# Patient Record
Sex: MALE | Race: WHITE | URBAN - METROPOLITAN AREA
[De-identification: names, ages, dates, MRNs, and addresses within clinical notes are randomized per-mention and may not be internally consistent; named-entity substitution may affect disease eponyms.]

---

## 2017-02-01 RX ORDER — ATORVASTATIN CALCIUM 40 MG/1
40 TABLET, FILM COATED ORAL DAILY
Qty: 90 TABLET | Refills: 3 | Status: SHIPPED | OUTPATIENT
Start: 2017-02-01 | End: 2017-12-20 | Stop reason: DRUGHIGH

## 2017-03-21 ENCOUNTER — TELEPHONE (OUTPATIENT)
Dept: CARDIOLOGY CLINIC | Age: 82
End: 2017-03-21

## 2017-03-21 RX ORDER — RAMIPRIL 5 MG/1
CAPSULE ORAL
Qty: 90 CAPSULE | Refills: 2 | Status: SHIPPED | OUTPATIENT
Start: 2017-03-21 | End: 2017-11-30 | Stop reason: SDUPTHER

## 2017-05-05 RX ORDER — CLOPIDOGREL BISULFATE 75 MG/1
TABLET ORAL
Qty: 30 TABLET | Refills: 10 | Status: SHIPPED | OUTPATIENT
Start: 2017-05-05 | End: 2018-04-02 | Stop reason: SDUPTHER

## 2017-05-17 ENCOUNTER — OFFICE VISIT (OUTPATIENT)
Dept: CARDIOLOGY CLINIC | Age: 82
End: 2017-05-17

## 2017-05-17 VITALS
BODY MASS INDEX: 25.69 KG/M2 | HEART RATE: 60 BPM | DIASTOLIC BLOOD PRESSURE: 70 MMHG | WEIGHT: 164 LBS | SYSTOLIC BLOOD PRESSURE: 120 MMHG

## 2017-05-17 DIAGNOSIS — I65.22 STENOSIS OF LEFT CAROTID ARTERY: ICD-10-CM

## 2017-05-17 DIAGNOSIS — Z98.61 POSTSURGICAL PERCUTANEOUS TRANSLUMINAL CORONARY ANGIOPLASTY STATUS: ICD-10-CM

## 2017-05-17 DIAGNOSIS — I10 ESSENTIAL HYPERTENSION: Chronic | ICD-10-CM

## 2017-05-17 DIAGNOSIS — I73.9 PVD (PERIPHERAL VASCULAR DISEASE) (HCC): ICD-10-CM

## 2017-05-17 DIAGNOSIS — I21.09 ACUTE MI ANTERIOR WALL FIRST EPISODE CARE (HCC): Primary | ICD-10-CM

## 2017-05-17 PROCEDURE — G8598 ASA/ANTIPLAT THER USED: HCPCS | Performed by: INTERNAL MEDICINE

## 2017-05-17 PROCEDURE — 1123F ACP DISCUSS/DSCN MKR DOCD: CPT | Performed by: INTERNAL MEDICINE

## 2017-05-17 PROCEDURE — 99214 OFFICE O/P EST MOD 30 MIN: CPT | Performed by: INTERNAL MEDICINE

## 2017-05-17 PROCEDURE — G8420 CALC BMI NORM PARAMETERS: HCPCS | Performed by: INTERNAL MEDICINE

## 2017-05-17 PROCEDURE — G8427 DOCREV CUR MEDS BY ELIG CLIN: HCPCS | Performed by: INTERNAL MEDICINE

## 2017-05-17 PROCEDURE — 4040F PNEUMOC VAC/ADMIN/RCVD: CPT | Performed by: INTERNAL MEDICINE

## 2017-05-17 PROCEDURE — 1036F TOBACCO NON-USER: CPT | Performed by: INTERNAL MEDICINE

## 2017-05-22 ENCOUNTER — HOSPITAL ENCOUNTER (OUTPATIENT)
Dept: VASCULAR LAB | Age: 82
Discharge: HOME | End: 2017-05-22
Attending: INTERNAL MEDICINE | Admitting: INTERNAL MEDICINE

## 2017-05-22 DIAGNOSIS — I65.22 STENOSIS OF LEFT CAROTID ARTERY: Primary | ICD-10-CM

## 2017-05-22 DIAGNOSIS — Z98.61 CORONARY ANGIOPLASTY STATUS: ICD-10-CM

## 2017-05-30 ENCOUNTER — TELEPHONE (OUTPATIENT)
Dept: CARDIOLOGY CLINIC | Age: 82
End: 2017-05-30

## 2017-11-06 RX ORDER — METOPROLOL SUCCINATE 25 MG/1
TABLET, EXTENDED RELEASE ORAL
Qty: 30 TABLET | Refills: 3 | Status: SHIPPED | OUTPATIENT
Start: 2017-11-06 | End: 2018-04-09 | Stop reason: SDUPTHER

## 2017-11-30 RX ORDER — RAMIPRIL 5 MG/1
CAPSULE ORAL
Qty: 90 CAPSULE | Refills: 3 | Status: SHIPPED | OUTPATIENT
Start: 2017-11-30 | End: 2018-11-19 | Stop reason: SDUPTHER

## 2017-12-20 ENCOUNTER — OFFICE VISIT (OUTPATIENT)
Dept: CARDIOLOGY CLINIC | Age: 82
End: 2017-12-20

## 2017-12-20 VITALS
SYSTOLIC BLOOD PRESSURE: 124 MMHG | DIASTOLIC BLOOD PRESSURE: 78 MMHG | BODY MASS INDEX: 25.69 KG/M2 | HEART RATE: 64 BPM | WEIGHT: 164 LBS

## 2017-12-20 DIAGNOSIS — I10 ESSENTIAL HYPERTENSION: Primary | ICD-10-CM

## 2017-12-20 DIAGNOSIS — I21.09 ACUTE MI ANTERIOR WALL FIRST EPISODE CARE (HCC): ICD-10-CM

## 2017-12-20 PROCEDURE — G8484 FLU IMMUNIZE NO ADMIN: HCPCS | Performed by: INTERNAL MEDICINE

## 2017-12-20 PROCEDURE — G8598 ASA/ANTIPLAT THER USED: HCPCS | Performed by: INTERNAL MEDICINE

## 2017-12-20 PROCEDURE — G8419 CALC BMI OUT NRM PARAM NOF/U: HCPCS | Performed by: INTERNAL MEDICINE

## 2017-12-20 PROCEDURE — G8427 DOCREV CUR MEDS BY ELIG CLIN: HCPCS | Performed by: INTERNAL MEDICINE

## 2017-12-20 PROCEDURE — 1123F ACP DISCUSS/DSCN MKR DOCD: CPT | Performed by: INTERNAL MEDICINE

## 2017-12-20 PROCEDURE — 1036F TOBACCO NON-USER: CPT | Performed by: INTERNAL MEDICINE

## 2017-12-20 PROCEDURE — 99214 OFFICE O/P EST MOD 30 MIN: CPT | Performed by: INTERNAL MEDICINE

## 2017-12-20 PROCEDURE — 4040F PNEUMOC VAC/ADMIN/RCVD: CPT | Performed by: INTERNAL MEDICINE

## 2017-12-20 RX ORDER — ATORVASTATIN CALCIUM 40 MG/1
20 TABLET, FILM COATED ORAL DAILY
Qty: 90 TABLET | Refills: 3 | Status: SHIPPED | OUTPATIENT
Start: 2017-12-20 | End: 2019-08-27 | Stop reason: SINTOL

## 2017-12-20 NOTE — PROGRESS NOTES
Psychiatric/Behavioral: Negative. Objective:   Physical Exam   Nursing note and vitals reviewed. Constitutional: He is oriented to person, place, and time. He appears well-developed and well-nourished. No distress. HENT:   Head: Normocephalic and atraumatic. Eyes: Conjunctivae and EOM are normal. Pupils are equal, round, and reactive to light. No scleral icterus. Neck: Normal range of motion. Neck supple. No JVD present. No tracheal deviation present. No thyromegaly present. Cardiovascular: Normal rate, regular rhythm, normal heart sounds and intact distal pulses. Exam reveals no gallop and no friction rub. No murmur heard. Pulmonary/Chest: Effort normal. No respiratory distress. He has no wheezes. He has no rales. He exhibits no tenderness. Abdominal: Soft. Bowel sounds are normal. He exhibits no distension and no mass. There is no tenderness. There is no rebound and no guarding. Musculoskeletal: He exhibits no edema and no tenderness. Lymphadenopathy:     He has no cervical adenopathy. Neurological: He is alert and oriented to person, place, and time. No cranial nerve deficit. Coordination normal.   Skin: Skin is warm and dry. No rash noted. He is not diaphoretic. No erythema. No pallor. Psychiatric: He has a normal mood and affect. His behavior is normal. Judgment and thought content normal.       Assessment:      Patient Active Problem List   Diagnosis    Chest pain, cardiac    HTN (hypertension)    Hyperlipidemia    Acute MI anterior wall [facility])    Postsurgical percutaneous transluminal coronary angioplasty status           Plan:      Continue current medications. Stable cardiovascular status. HTN is controlled. US of neck was reviewed and has left ICA 50-69%. Decrease lipitor 20 mg daily. Stable CV status.

## 2018-04-02 RX ORDER — CLOPIDOGREL BISULFATE 75 MG/1
TABLET ORAL
Qty: 30 TABLET | Refills: 9 | Status: SHIPPED | OUTPATIENT
Start: 2018-04-02 | End: 2019-01-28 | Stop reason: SDUPTHER

## 2018-04-09 RX ORDER — METOPROLOL SUCCINATE 25 MG/1
TABLET, EXTENDED RELEASE ORAL
Qty: 45 TABLET | Refills: 3 | Status: SHIPPED | OUTPATIENT
Start: 2018-04-09

## 2018-06-13 ENCOUNTER — OFFICE VISIT (OUTPATIENT)
Dept: CARDIOLOGY CLINIC | Age: 83
End: 2018-06-13

## 2018-06-13 VITALS
SYSTOLIC BLOOD PRESSURE: 138 MMHG | WEIGHT: 157 LBS | HEART RATE: 62 BPM | DIASTOLIC BLOOD PRESSURE: 72 MMHG | BODY MASS INDEX: 24.59 KG/M2

## 2018-06-13 DIAGNOSIS — I77.9 BILATERAL CAROTID ARTERY DISEASE (HCC): ICD-10-CM

## 2018-06-13 DIAGNOSIS — R09.89 BILATERAL CAROTID BRUITS: ICD-10-CM

## 2018-06-13 DIAGNOSIS — I10 ESSENTIAL HYPERTENSION: Primary | Chronic | ICD-10-CM

## 2018-06-13 DIAGNOSIS — I21.09 ACUTE MI ANTERIOR WALL FIRST EPISODE CARE (HCC): ICD-10-CM

## 2018-06-13 DIAGNOSIS — Z98.61 POSTSURGICAL PERCUTANEOUS TRANSLUMINAL CORONARY ANGIOPLASTY STATUS: ICD-10-CM

## 2018-06-13 DIAGNOSIS — I73.9 PVD (PERIPHERAL VASCULAR DISEASE) (HCC): ICD-10-CM

## 2018-06-13 PROCEDURE — 1036F TOBACCO NON-USER: CPT | Performed by: INTERNAL MEDICINE

## 2018-06-13 PROCEDURE — G8427 DOCREV CUR MEDS BY ELIG CLIN: HCPCS | Performed by: INTERNAL MEDICINE

## 2018-06-13 PROCEDURE — G8420 CALC BMI NORM PARAMETERS: HCPCS | Performed by: INTERNAL MEDICINE

## 2018-06-13 PROCEDURE — 1123F ACP DISCUSS/DSCN MKR DOCD: CPT | Performed by: INTERNAL MEDICINE

## 2018-06-13 PROCEDURE — G8598 ASA/ANTIPLAT THER USED: HCPCS | Performed by: INTERNAL MEDICINE

## 2018-06-13 PROCEDURE — 4040F PNEUMOC VAC/ADMIN/RCVD: CPT | Performed by: INTERNAL MEDICINE

## 2018-06-13 PROCEDURE — 99214 OFFICE O/P EST MOD 30 MIN: CPT | Performed by: INTERNAL MEDICINE

## 2018-06-21 ENCOUNTER — HOSPITAL ENCOUNTER (OUTPATIENT)
Dept: NON INVASIVE DIAGNOSTICS | Age: 83
Discharge: HOME | End: 2018-06-21
Admitting: INTERNAL MEDICINE

## 2018-06-21 DIAGNOSIS — R09.89 CAROTID BRUIT, UNSPECIFIED LATERALITY: Primary | ICD-10-CM

## 2018-06-21 DIAGNOSIS — I21.09 ST ELEVATION (STEMI) MYOCARDIAL INFARCTION INVOLVING OTHER CORONARY ARTERY OF ANTERIOR WALL (CODE): ICD-10-CM

## 2018-06-21 DIAGNOSIS — I65.23 BILATERAL CAROTID ARTERY STENOSIS: ICD-10-CM

## 2018-06-21 LAB
LV EF: 50 %
LVEF MODALITY: NORMAL

## 2018-06-28 ENCOUNTER — TELEPHONE (OUTPATIENT)
Dept: CARDIOLOGY CLINIC | Age: 83
End: 2018-06-28

## 2018-06-28 DIAGNOSIS — I65.22 STENOSIS OF LEFT CAROTID ARTERY: ICD-10-CM

## 2018-06-28 DIAGNOSIS — I73.9 PVD (PERIPHERAL VASCULAR DISEASE) (HCC): Primary | ICD-10-CM

## 2018-11-07 ENCOUNTER — OFFICE VISIT (OUTPATIENT)
Dept: CARDIOLOGY CLINIC | Age: 83
End: 2018-11-07
Payer: MEDICARE

## 2018-11-07 VITALS
HEART RATE: 74 BPM | DIASTOLIC BLOOD PRESSURE: 60 MMHG | SYSTOLIC BLOOD PRESSURE: 138 MMHG | BODY MASS INDEX: 24.78 KG/M2 | WEIGHT: 158.2 LBS

## 2018-11-07 DIAGNOSIS — Z98.61 POSTSURGICAL PERCUTANEOUS TRANSLUMINAL CORONARY ANGIOPLASTY STATUS: Primary | ICD-10-CM

## 2018-11-07 DIAGNOSIS — I73.9 PVD (PERIPHERAL VASCULAR DISEASE) (HCC): ICD-10-CM

## 2018-11-07 DIAGNOSIS — I21.09 ACUTE MI ANTERIOR WALL FIRST EPISODE CARE (HCC): ICD-10-CM

## 2018-11-07 DIAGNOSIS — E78.2 MIXED HYPERLIPIDEMIA: Chronic | ICD-10-CM

## 2018-11-07 PROCEDURE — 1036F TOBACCO NON-USER: CPT | Performed by: INTERNAL MEDICINE

## 2018-11-07 PROCEDURE — G8598 ASA/ANTIPLAT THER USED: HCPCS | Performed by: INTERNAL MEDICINE

## 2018-11-07 PROCEDURE — 99214 OFFICE O/P EST MOD 30 MIN: CPT | Performed by: INTERNAL MEDICINE

## 2018-11-07 PROCEDURE — 4040F PNEUMOC VAC/ADMIN/RCVD: CPT | Performed by: INTERNAL MEDICINE

## 2018-11-07 PROCEDURE — G8484 FLU IMMUNIZE NO ADMIN: HCPCS | Performed by: INTERNAL MEDICINE

## 2018-11-07 PROCEDURE — 1123F ACP DISCUSS/DSCN MKR DOCD: CPT | Performed by: INTERNAL MEDICINE

## 2018-11-07 PROCEDURE — G8427 DOCREV CUR MEDS BY ELIG CLIN: HCPCS | Performed by: INTERNAL MEDICINE

## 2018-11-07 PROCEDURE — 1101F PT FALLS ASSESS-DOCD LE1/YR: CPT | Performed by: INTERNAL MEDICINE

## 2018-11-07 PROCEDURE — G8420 CALC BMI NORM PARAMETERS: HCPCS | Performed by: INTERNAL MEDICINE

## 2018-11-19 RX ORDER — RAMIPRIL 5 MG/1
CAPSULE ORAL
Qty: 90 CAPSULE | Refills: 3 | Status: SHIPPED | OUTPATIENT
Start: 2018-11-19 | End: 2019-10-02

## 2019-01-28 RX ORDER — CLOPIDOGREL BISULFATE 75 MG/1
TABLET ORAL
Qty: 90 TABLET | Refills: 3 | Status: SHIPPED | OUTPATIENT
Start: 2019-01-28 | End: 2019-07-31

## 2019-06-10 ENCOUNTER — OFFICE VISIT (OUTPATIENT)
Dept: CARDIOLOGY CLINIC | Age: 84
End: 2019-06-10
Payer: MEDICARE

## 2019-06-10 VITALS
BODY MASS INDEX: 24.59 KG/M2 | DIASTOLIC BLOOD PRESSURE: 80 MMHG | WEIGHT: 157 LBS | SYSTOLIC BLOOD PRESSURE: 130 MMHG | HEART RATE: 53 BPM

## 2019-06-10 DIAGNOSIS — I73.9 PVD (PERIPHERAL VASCULAR DISEASE) (HCC): Primary | ICD-10-CM

## 2019-06-10 PROCEDURE — G8427 DOCREV CUR MEDS BY ELIG CLIN: HCPCS | Performed by: INTERNAL MEDICINE

## 2019-06-10 PROCEDURE — 1036F TOBACCO NON-USER: CPT | Performed by: INTERNAL MEDICINE

## 2019-06-10 PROCEDURE — 4040F PNEUMOC VAC/ADMIN/RCVD: CPT | Performed by: INTERNAL MEDICINE

## 2019-06-10 PROCEDURE — 99213 OFFICE O/P EST LOW 20 MIN: CPT | Performed by: INTERNAL MEDICINE

## 2019-06-10 PROCEDURE — G8598 ASA/ANTIPLAT THER USED: HCPCS | Performed by: INTERNAL MEDICINE

## 2019-06-10 PROCEDURE — 1123F ACP DISCUSS/DSCN MKR DOCD: CPT | Performed by: INTERNAL MEDICINE

## 2019-06-10 PROCEDURE — G8420 CALC BMI NORM PARAMETERS: HCPCS | Performed by: INTERNAL MEDICINE

## 2019-06-10 PROCEDURE — 93000 ELECTROCARDIOGRAM COMPLETE: CPT | Performed by: INTERNAL MEDICINE

## 2019-06-10 NOTE — PROGRESS NOTES
Hypertension, and MI (myocardial infarction) ([facility] [address] Current Outpatient Medications   Medication Sig Dispense Refill    clopidogrel (PLAVIX) 75 MG tablet TAKE ONE TABLET BY MOUTH DAILY 90 tablet 3    ramipril (ALTACE) 5 MG capsule TAKE ONE CAPSULE BY MOUTH DAILY 90 capsule 3    vitamin D (CHOLECALCIFEROL) 1000 UNIT TABS tablet Take 1,000 Units by mouth daily      folic acid-pyridoxine-cyancobalamin (FOLBIC) 2.5-25-2 MG TABS Take 1 tablet by mouth daily      metoprolol succinate (TOPROL XL) 25 MG extended release tablet TAKE ONE-HALF TABLET BY MOUTH DAILY 45 tablet 3    atorvastatin (LIPITOR) 40 MG tablet Take 0.5 tablets by mouth daily 90 tablet 3    solifenacin (VESICARE) 5 MG tablet Take 10 mg by mouth daily      aspirin 81 MG tablet Take 81 mg by mouth daily.  Dutasteride (AVODART PO) Take  by mouth daily. No current facility-administered medications for this visit. Vitals  Weight: 157 lb (71.2 kg)  Blood Pressure:  138/84  Pulse: 53       Review of Systems   Constitutional: Negative. HENT: Negative. Eyes: Negative. Respiratory: Negative. Cardiovascular: Negative. Gastrointestinal: Negative. Genitourinary: Negative. Musculoskeletal: Negative. Skin: Negative. Neurological: Negative. Hematological: Negative. Psychiatric/Behavioral: Negative. Objective:   Physical Exam   Nursing note and vitals reviewed. Constitutional: He is oriented to person, place, and time. He appears well-developed and well-nourished. No distress. HENT:   Head: Normocephalic and atraumatic. Eyes: Conjunctivae and EOM are normal. Pupils are equal, round, and reactive to light. No scleral icterus. Neck: Normal range of motion. Neck supple. No JVD present. No tracheal deviation present. No thyromegaly present. Cardiovascular: Normal rate, regular rhythm, normal heart sounds and intact distal pulses. Exam reveals no gallop and no friction rub.     No murmur heard.  Pulmonary/Chest: Effort normal. No respiratory distress. He has no wheezes. He has no rales. He exhibits no tenderness. Abdominal: Soft. Bowel sounds are normal. He exhibits no distension and no mass. There is no tenderness. There is no rebound and no guarding. Musculoskeletal: He exhibits no edema and no tenderness. Lymphadenopathy:     He has no cervical adenopathy. Neurological: He is alert and oriented to person, place, and time. No cranial nerve deficit. Coordination normal.   Skin: Skin is warm and dry. No rash noted. He is not diaphoretic. No erythema. No pallor. Psychiatric: He has a normal mood and affect. His behavior is normal. Judgment and thought content normal.       Assessment:      Patient Active Problem List   Diagnosis    Chest pain, cardiac    HTN (hypertension)    Hyperlipidemia    Acute MI anterior wall first episode care [facility])    Postsurgical percutaneous transluminal coronary angioplasty status           Plan:      Continue current medications. Stable cardiovascular status. HTN is controlled. LBBB and SR.  US of neck was reviewed and has left ICA 50-69%. Needs yearly assessment. Hypercholesterolemia continuing lipitor 40 mg daily. ldl 69 on 4/5/18  PVD:  Seeing and following up with Dr. Keke Gutierrez for carotid stenoses that are asymptomatic  CAD:  Stable and no chest pain nor syncope nor palptiations.

## 2019-07-31 ENCOUNTER — OFFICE VISIT (OUTPATIENT)
Dept: CARDIOLOGY CLINIC | Age: 84
End: 2019-07-31
Payer: MEDICARE

## 2019-07-31 VITALS
BODY MASS INDEX: 25.03 KG/M2 | DIASTOLIC BLOOD PRESSURE: 80 MMHG | WEIGHT: 159.8 LBS | SYSTOLIC BLOOD PRESSURE: 130 MMHG | HEART RATE: 71 BPM

## 2019-07-31 DIAGNOSIS — I48.91 ATRIAL FIBRILLATION, UNSPECIFIED TYPE (HCC): ICD-10-CM

## 2019-07-31 DIAGNOSIS — I73.9 PVD (PERIPHERAL VASCULAR DISEASE) (HCC): ICD-10-CM

## 2019-07-31 DIAGNOSIS — Z98.61 POSTSURGICAL PERCUTANEOUS TRANSLUMINAL CORONARY ANGIOPLASTY STATUS: Primary | ICD-10-CM

## 2019-07-31 PROCEDURE — 93000 ELECTROCARDIOGRAM COMPLETE: CPT | Performed by: INTERNAL MEDICINE

## 2019-07-31 PROCEDURE — 4040F PNEUMOC VAC/ADMIN/RCVD: CPT | Performed by: INTERNAL MEDICINE

## 2019-07-31 PROCEDURE — G8419 CALC BMI OUT NRM PARAM NOF/U: HCPCS | Performed by: INTERNAL MEDICINE

## 2019-07-31 PROCEDURE — G8598 ASA/ANTIPLAT THER USED: HCPCS | Performed by: INTERNAL MEDICINE

## 2019-07-31 PROCEDURE — 1123F ACP DISCUSS/DSCN MKR DOCD: CPT | Performed by: INTERNAL MEDICINE

## 2019-07-31 PROCEDURE — 1036F TOBACCO NON-USER: CPT | Performed by: INTERNAL MEDICINE

## 2019-07-31 PROCEDURE — 99214 OFFICE O/P EST MOD 30 MIN: CPT | Performed by: INTERNAL MEDICINE

## 2019-07-31 PROCEDURE — G8428 CUR MEDS NOT DOCUMENT: HCPCS | Performed by: INTERNAL MEDICINE

## 2019-07-31 RX ORDER — AMIODARONE HYDROCHLORIDE 200 MG/1
200 TABLET ORAL
COMMUNITY
Start: 2019-07-23 | End: 2019-08-20 | Stop reason: SDUPTHER

## 2019-07-31 NOTE — PROGRESS NOTES
Subjective:      Patient ID: Malik Odom is a 80 y.o. male. CC:  Follow up of CAD with stenting during MI in 2012. HPI Mr. Irlanda Hurley feels well and has no chest pain but did with Atrial fib and was started amiodarone 200mg daily after load at [address] and started on eliquis for 2.5 mg bid. No chest pain now and no HF. SR with LBBB on ecg today. No Known Allergies     Social History     Socioeconomic History    Marital status:      Spouse name: Not on file    Number of children: Not on file    Years of education: Not on file    Highest education level: Not on file   Occupational History    Not on file   Social Needs    Financial resource strain: Not on file    Food insecurity:     Worry: Not on file     Inability: Not on file    Transportation needs:     Medical: Not on file     Non-medical: Not on file   Tobacco Use    Smoking status: Former Smoker     Last attempt to quit: 2009     Years since quittin.6    Smokeless tobacco: Never Used   Substance and Sexual Activity    Alcohol use: No    Drug use: No    Sexual activity: Not on file   Lifestyle    Physical activity:     Days per week: Not on file     Minutes per session: Not on file    Stress: Not on file   Relationships    Social connections:     Talks on phone: Not on file     Gets together: Not on file     Attends Confucianist service: Not on file     Active member of club or organization: Not on file     Attends meetings of clubs or organizations: Not on file     Relationship status: Not on file    Intimate partner violence:     Fear of current or ex partner: Not on file     Emotionally abused: Not on file     Physically abused: Not on file     Forced sexual activity: Not on file   Other Topics Concern    Not on file   Social History Narrative    Not on file        Patient has a family history is not on file.     Patient  has a past medical history of CAD (coronary artery disease), Hyperlipidemia, Hypertension,

## 2019-08-20 ENCOUNTER — TELEPHONE (OUTPATIENT)
Dept: CARDIOLOGY CLINIC | Age: 84
End: 2019-08-20

## 2019-08-20 RX ORDER — AMIODARONE HYDROCHLORIDE 200 MG/1
200 TABLET ORAL DAILY
Qty: 90 TABLET | Refills: 3 | Status: SHIPPED | OUTPATIENT
Start: 2019-08-20 | End: 2019-10-02 | Stop reason: SDUPTHER

## 2019-08-27 DIAGNOSIS — E78.2 MIXED HYPERLIPIDEMIA: Primary | Chronic | ICD-10-CM

## 2019-08-27 RX ORDER — ROSUVASTATIN CALCIUM 5 MG/1
5 TABLET, COATED ORAL DAILY
Qty: 90 TABLET | Refills: 1 | Status: SHIPPED | OUTPATIENT
Start: 2019-08-27 | End: 2020-02-17

## 2019-09-06 ENCOUNTER — TELEPHONE (OUTPATIENT)
Dept: CARDIOLOGY CLINIC | Age: 84
End: 2019-09-06

## 2019-09-30 DIAGNOSIS — I48.91 ATRIAL FIBRILLATION, UNSPECIFIED TYPE (HCC): ICD-10-CM

## 2019-09-30 DIAGNOSIS — I73.9 PVD (PERIPHERAL VASCULAR DISEASE) (HCC): ICD-10-CM

## 2019-09-30 DIAGNOSIS — E78.2 MIXED HYPERLIPIDEMIA: Chronic | ICD-10-CM

## 2019-09-30 DIAGNOSIS — Z98.61 POSTSURGICAL PERCUTANEOUS TRANSLUMINAL CORONARY ANGIOPLASTY STATUS: ICD-10-CM

## 2019-09-30 LAB
A/G RATIO: 1.3 (ref 1.1–2.2)
ALBUMIN SERPL-MCNC: 4 G/DL (ref 3.4–5)
ALP BLD-CCNC: 72 U/L (ref 40–129)
ALT SERPL-CCNC: 14 U/L (ref 10–40)
ANION GAP SERPL CALCULATED.3IONS-SCNC: 12 MMOL/L (ref 3–16)
AST SERPL-CCNC: 20 U/L (ref 15–37)
BILIRUB SERPL-MCNC: 0.5 MG/DL (ref 0–1)
BUN BLDV-MCNC: 27 MG/DL (ref 7–20)
CALCIUM SERPL-MCNC: 9.6 MG/DL (ref 8.3–10.6)
CHLORIDE BLD-SCNC: 104 MMOL/L (ref 99–110)
CHOLESTEROL, TOTAL: 166 MG/DL (ref 0–199)
CO2: 26 MMOL/L (ref 21–32)
CREAT SERPL-MCNC: 2.2 MG/DL (ref 0.8–1.3)
GFR AFRICAN AMERICAN: 35
GFR NON-AFRICAN AMERICAN: 29
GLOBULIN: 3 G/DL
GLUCOSE BLD-MCNC: 95 MG/DL (ref 70–99)
HCT VFR BLD CALC: 42.6 % (ref 40.5–52.5)
HDLC SERPL-MCNC: 85 MG/DL (ref 40–60)
HEMOGLOBIN: 13.8 G/DL (ref 13.5–17.5)
LDL CHOLESTEROL CALCULATED: 64 MG/DL
MCH RBC QN AUTO: 29.7 PG (ref 26–34)
MCHC RBC AUTO-ENTMCNC: 32.5 G/DL (ref 31–36)
MCV RBC AUTO: 91.5 FL (ref 80–100)
PDW BLD-RTO: 17.3 % (ref 12.4–15.4)
PLATELET # BLD: 211 K/UL (ref 135–450)
PMV BLD AUTO: 9.1 FL (ref 5–10.5)
POTASSIUM SERPL-SCNC: 5.2 MMOL/L (ref 3.5–5.1)
RBC # BLD: 4.66 M/UL (ref 4.2–5.9)
SODIUM BLD-SCNC: 142 MMOL/L (ref 136–145)
T4 FREE: 1.5 NG/DL (ref 0.9–1.8)
TOTAL PROTEIN: 7 G/DL (ref 6.4–8.2)
TRIGL SERPL-MCNC: 86 MG/DL (ref 0–150)
TSH REFLEX: 4.39 UIU/ML (ref 0.27–4.2)
VLDLC SERPL CALC-MCNC: 17 MG/DL
WBC # BLD: 9.2 K/UL (ref 4–11)

## 2019-10-02 ENCOUNTER — OFFICE VISIT (OUTPATIENT)
Dept: CARDIOLOGY CLINIC | Age: 84
End: 2019-10-02
Payer: MEDICARE

## 2019-10-02 VITALS
HEART RATE: 58 BPM | SYSTOLIC BLOOD PRESSURE: 120 MMHG | BODY MASS INDEX: 23.87 KG/M2 | DIASTOLIC BLOOD PRESSURE: 70 MMHG | WEIGHT: 152.4 LBS

## 2019-10-02 DIAGNOSIS — I10 ESSENTIAL HYPERTENSION: Primary | Chronic | ICD-10-CM

## 2019-10-02 DIAGNOSIS — Z98.61 POSTSURGICAL PERCUTANEOUS TRANSLUMINAL CORONARY ANGIOPLASTY STATUS: ICD-10-CM

## 2019-10-02 DIAGNOSIS — I21.09 ACUTE MI ANTERIOR WALL FIRST EPISODE CARE (HCC): ICD-10-CM

## 2019-10-02 PROCEDURE — 1036F TOBACCO NON-USER: CPT | Performed by: INTERNAL MEDICINE

## 2019-10-02 PROCEDURE — 4040F PNEUMOC VAC/ADMIN/RCVD: CPT | Performed by: INTERNAL MEDICINE

## 2019-10-02 PROCEDURE — G8427 DOCREV CUR MEDS BY ELIG CLIN: HCPCS | Performed by: INTERNAL MEDICINE

## 2019-10-02 PROCEDURE — G8420 CALC BMI NORM PARAMETERS: HCPCS | Performed by: INTERNAL MEDICINE

## 2019-10-02 PROCEDURE — 1123F ACP DISCUSS/DSCN MKR DOCD: CPT | Performed by: INTERNAL MEDICINE

## 2019-10-02 PROCEDURE — G8484 FLU IMMUNIZE NO ADMIN: HCPCS | Performed by: INTERNAL MEDICINE

## 2019-10-02 PROCEDURE — 99214 OFFICE O/P EST MOD 30 MIN: CPT | Performed by: INTERNAL MEDICINE

## 2019-10-02 PROCEDURE — G8598 ASA/ANTIPLAT THER USED: HCPCS | Performed by: INTERNAL MEDICINE

## 2019-10-02 RX ORDER — AMIODARONE HYDROCHLORIDE 100 MG/1
100 TABLET ORAL DAILY
Qty: 90 TABLET | Refills: 3 | Status: SHIPPED | OUTPATIENT
Start: 2019-10-02 | End: 2020-10-05

## 2019-10-14 DIAGNOSIS — Z98.61 POSTSURGICAL PERCUTANEOUS TRANSLUMINAL CORONARY ANGIOPLASTY STATUS: ICD-10-CM

## 2019-10-14 DIAGNOSIS — I10 ESSENTIAL HYPERTENSION: Chronic | ICD-10-CM

## 2019-10-14 DIAGNOSIS — I21.09 ACUTE MI ANTERIOR WALL FIRST EPISODE CARE (HCC): ICD-10-CM

## 2019-10-14 LAB
BASOPHILS ABSOLUTE: 0.1 K/UL (ref 0–0.2)
BASOPHILS RELATIVE PERCENT: 0.8 %
EOSINOPHILS ABSOLUTE: 0.7 K/UL (ref 0–0.6)
EOSINOPHILS RELATIVE PERCENT: 7.1 %
HCT VFR BLD CALC: 42.3 % (ref 40.5–52.5)
HEMOGLOBIN: 13.8 G/DL (ref 13.5–17.5)
LYMPHOCYTES ABSOLUTE: 2.1 K/UL (ref 1–5.1)
LYMPHOCYTES RELATIVE PERCENT: 22.2 %
MCH RBC QN AUTO: 29.8 PG (ref 26–34)
MCHC RBC AUTO-ENTMCNC: 32.5 G/DL (ref 31–36)
MCV RBC AUTO: 91.8 FL (ref 80–100)
MONOCYTES ABSOLUTE: 0.9 K/UL (ref 0–1.3)
MONOCYTES RELATIVE PERCENT: 9.8 %
NEUTROPHILS ABSOLUTE: 5.6 K/UL (ref 1.7–7.7)
NEUTROPHILS RELATIVE PERCENT: 60.1 %
PDW BLD-RTO: 17.9 % (ref 12.4–15.4)
PLATELET # BLD: 207 K/UL (ref 135–450)
PMV BLD AUTO: 9.2 FL (ref 5–10.5)
RBC # BLD: 4.61 M/UL (ref 4.2–5.9)
WBC # BLD: 9.3 K/UL (ref 4–11)

## 2019-12-16 ENCOUNTER — OFFICE VISIT (OUTPATIENT)
Dept: CARDIOLOGY CLINIC | Age: 84
End: 2019-12-16
Payer: MEDICARE

## 2019-12-16 VITALS
SYSTOLIC BLOOD PRESSURE: 132 MMHG | WEIGHT: 157 LBS | HEART RATE: 60 BPM | DIASTOLIC BLOOD PRESSURE: 64 MMHG | BODY MASS INDEX: 24.59 KG/M2

## 2019-12-16 DIAGNOSIS — E78.2 MIXED HYPERLIPIDEMIA: ICD-10-CM

## 2019-12-16 DIAGNOSIS — I10 ESSENTIAL HYPERTENSION: ICD-10-CM

## 2019-12-16 DIAGNOSIS — R07.9 CHEST PAIN, CARDIAC: Primary | ICD-10-CM

## 2019-12-16 PROCEDURE — G8427 DOCREV CUR MEDS BY ELIG CLIN: HCPCS | Performed by: INTERNAL MEDICINE

## 2019-12-16 PROCEDURE — G8420 CALC BMI NORM PARAMETERS: HCPCS | Performed by: INTERNAL MEDICINE

## 2019-12-16 PROCEDURE — 99213 OFFICE O/P EST LOW 20 MIN: CPT | Performed by: INTERNAL MEDICINE

## 2019-12-16 PROCEDURE — G8484 FLU IMMUNIZE NO ADMIN: HCPCS | Performed by: INTERNAL MEDICINE

## 2019-12-16 PROCEDURE — 1123F ACP DISCUSS/DSCN MKR DOCD: CPT | Performed by: INTERNAL MEDICINE

## 2019-12-16 PROCEDURE — 4040F PNEUMOC VAC/ADMIN/RCVD: CPT | Performed by: INTERNAL MEDICINE

## 2019-12-16 PROCEDURE — G8598 ASA/ANTIPLAT THER USED: HCPCS | Performed by: INTERNAL MEDICINE

## 2019-12-16 PROCEDURE — 1036F TOBACCO NON-USER: CPT | Performed by: INTERNAL MEDICINE

## 2020-01-29 NOTE — TELEPHONE ENCOUNTER
[facility] Medication Refills:    Requested Prescriptions     Pending Prescriptions Disp Refills    clopidogrel (PLAVIX) 75 MG tablet [Pharmacy Med Name: CLOPIDOGREL 75 MG TABLET] 90 tablet 3     Sig: TAKE ONE TABLET BY MOUTH DAILY                     Last Office Visit: 12/16/19  Next Office Visit: 06.12.2020

## 2020-01-31 RX ORDER — CLOPIDOGREL BISULFATE 75 MG/1
TABLET ORAL
Qty: 90 TABLET | Refills: 3 | Status: SHIPPED | OUTPATIENT
Start: 2020-01-31 | End: 2021-01-25

## 2020-02-17 NOTE — TELEPHONE ENCOUNTER
Requested Prescriptions     Pending Prescriptions Disp Refills    rosuvastatin (CRESTOR) 5 MG tablet [Pharmacy Med Name: ROSUVASTATIN CALCIUM 5 MG TAB] 90 tablet 1     Sig: TAKE ONE TABLET BY MOUTH DAILY     Last Labs:  9/30/2019-Lipid, T4, TSH, CMP, CBC ([facility])    Last OV:  12/16/2019-[facility]    Next OV:  6/10/2020-[facility]    Last Fill:  8/27/19-[facility]

## 2020-02-18 RX ORDER — ROSUVASTATIN CALCIUM 5 MG/1
TABLET, COATED ORAL
Qty: 90 TABLET | Refills: 1 | Status: SHIPPED | OUTPATIENT
Start: 2020-02-18

## 2020-06-10 ENCOUNTER — OFFICE VISIT (OUTPATIENT)
Dept: CARDIOLOGY CLINIC | Age: 85
End: 2020-06-10
Payer: MEDICARE

## 2020-06-10 VITALS
HEART RATE: 56 BPM | WEIGHT: 158 LBS | SYSTOLIC BLOOD PRESSURE: 120 MMHG | BODY MASS INDEX: 24.75 KG/M2 | DIASTOLIC BLOOD PRESSURE: 70 MMHG

## 2020-06-10 PROCEDURE — 93000 ELECTROCARDIOGRAM COMPLETE: CPT | Performed by: INTERNAL MEDICINE

## 2020-06-10 PROCEDURE — 1036F TOBACCO NON-USER: CPT | Performed by: INTERNAL MEDICINE

## 2020-06-10 PROCEDURE — G8427 DOCREV CUR MEDS BY ELIG CLIN: HCPCS | Performed by: INTERNAL MEDICINE

## 2020-06-10 PROCEDURE — G8420 CALC BMI NORM PARAMETERS: HCPCS | Performed by: INTERNAL MEDICINE

## 2020-06-10 PROCEDURE — 4040F PNEUMOC VAC/ADMIN/RCVD: CPT | Performed by: INTERNAL MEDICINE

## 2020-06-10 PROCEDURE — 1123F ACP DISCUSS/DSCN MKR DOCD: CPT | Performed by: INTERNAL MEDICINE

## 2020-06-10 PROCEDURE — 99213 OFFICE O/P EST LOW 20 MIN: CPT | Performed by: INTERNAL MEDICINE

## 2020-06-10 RX ORDER — AMLODIPINE BESYLATE 5 MG/1
TABLET ORAL
COMMUNITY
Start: 2020-05-28

## 2020-06-10 RX ORDER — OLMESARTAN MEDOXOMIL 5 MG/1
5 TABLET ORAL DAILY
COMMUNITY
Start: 2020-05-28 | End: 2021-05-28

## 2020-08-14 RX ORDER — APIXABAN 2.5 MG/1
TABLET, FILM COATED ORAL
Qty: 180 TABLET | Refills: 2 | Status: SHIPPED | OUTPATIENT
Start: 2020-08-14 | End: 2021-05-10

## 2020-09-10 NOTE — PROGRESS NOTES
Subjective:      Patient ID: Angelo Masters is a 80 y.o. male. CC:  Follow up of CAD with stenting during MI in 2012. HPI Mr. Rosa Maria Calzada feels well and has no chest pain but did with Atrial fib and was started amiodarone 200mg daily after load at [address] and started on eliquis for 2.5 mg bid. No chest pain now and no HF. SR with LBBB on ecg today. No Known Allergies     Social History     Socioeconomic History    Marital status:      Spouse name: Not on file    Number of children: Not on file    Years of education: Not on file    Highest education level: Not on file   Occupational History    Not on file   Social Needs    Financial resource strain: Not on file    Food insecurity     Worry: Not on file     Inability: Not on file    Transportation needs     Medical: Not on file     Non-medical: Not on file   Tobacco Use    Smoking status: Former Smoker     Last attempt to quit: 12/20/2009     Years since quitting: 10.7    Smokeless tobacco: Never Used   Substance and Sexual Activity    Alcohol use: No    Drug use: No    Sexual activity: Not on file   Lifestyle    Physical activity     Days per week: Not on file     Minutes per session: Not on file    Stress: Not on file   Relationships    Social connections     Talks on phone: Not on file     Gets together: Not on file     Attends Adventism service: Not on file     Active member of club or organization: Not on file     Attends meetings of clubs or organizations: Not on file     Relationship status: Not on file    Intimate partner violence     Fear of current or ex partner: Not on file     Emotionally abused: Not on file     Physically abused: Not on file     Forced sexual activity: Not on file   Other Topics Concern    Not on file   Social History Narrative    Not on file        Patient has a family history is not on file.     Patient  has a past medical history of CAD (coronary artery disease), Hyperlipidemia, Hypertension, and [facility] Current Outpatient Medications   Medication Sig Dispense Refill    amLODIPine (NORVASC) 5 MG tablet       olmesartan (BENICAR) 5 MG tablet Take 5 mg by mouth daily      rosuvastatin (CRESTOR) 5 MG tablet TAKE ONE TABLET BY MOUTH DAILY 90 tablet 1    clopidogrel (PLAVIX) 75 MG tablet TAKE ONE TABLET BY MOUTH DAILY 90 tablet 3    amiodarone (PACERONE) 100 MG tablet Take 1 tablet by mouth daily 90 tablet 3    vitamin D (CHOLECALCIFEROL) 1000 UNIT TABS tablet Take 1,000 Units by mouth daily      folic acid-pyridoxine-cyancobalamin (FOLBIC) 2.5-25-2 MG TABS Take 1 tablet by mouth daily      metoprolol succinate (TOPROL XL) 25 MG extended release tablet TAKE ONE-HALF TABLET BY MOUTH DAILY 45 tablet 3    solifenacin (VESICARE) 5 MG tablet Take 10 mg by mouth daily      Dutasteride (AVODART PO) Take  by mouth daily.  ELIQUIS 2.5 MG TABS tablet TAKE ONE TABLET BY MOUTH TWICE A  tablet 2     No current facility-administered medications for this visit. Vitals  Weight: 158 lb (71.7 kg)  Blood Pressure:  138/84  Pulse: 56       Review of Systems   Constitutional: Negative. HENT: Negative. Eyes: Negative. Respiratory: Negative. Cardiovascular: Negative. Gastrointestinal: Negative. Genitourinary: Negative. Musculoskeletal: Negative. Skin: Negative. Neurological: Negative. Hematological: Negative. Psychiatric/Behavioral: Negative. Objective:   Physical Exam   Nursing note and vitals reviewed. Constitutional: He is oriented to person, place, and time. He appears well-developed and well-nourished. No distress. HENT:   Head: Normocephalic and atraumatic. Eyes: Conjunctivae and EOM are normal. Pupils are equal, round, and reactive to light. No scleral icterus. Neck: Normal range of motion. Neck supple. No JVD present. No tracheal deviation present. No thyromegaly present.    Cardiovascular: Normal rate, regular rhythm,

## 2020-10-06 RX ORDER — AMIODARONE HYDROCHLORIDE 100 MG/1
TABLET ORAL
Qty: 90 TABLET | Refills: 3 | Status: SHIPPED | OUTPATIENT
Start: 2020-10-06

## 2021-05-10 RX ORDER — APIXABAN 2.5 MG/1
TABLET, FILM COATED ORAL
Qty: 60 TABLET | Refills: 1 | Status: SHIPPED | OUTPATIENT
Start: 2021-05-10 | End: 2021-07-06

## 2021-07-06 RX ORDER — APIXABAN 2.5 MG/1
TABLET, FILM COATED ORAL
Qty: 60 TABLET | Refills: 0 | Status: SHIPPED | OUTPATIENT
Start: 2021-07-06 | End: 2021-08-05

## 2021-08-06 RX ORDER — APIXABAN 2.5 MG/1
TABLET, FILM COATED ORAL
Qty: 60 TABLET | Refills: 0 | Status: SHIPPED | OUTPATIENT
Start: 2021-08-06

## 2022-11-16 ENCOUNTER — HOSPITAL ENCOUNTER (OUTPATIENT)
Age: 87
Setting detail: SPECIMEN
Discharge: HOME | End: 2022-11-16
Payer: MEDICARE

## 2022-11-16 LAB
ALBUMIN SERPL-MCNC: 4.1 G/DL (ref 3.4–5)
ANION GAP SERPL CALCULATED.3IONS-SCNC: 9 MMOL/L (ref 3–16)
BASOPHILS ABSOLUTE: 0.1 K/UL (ref 0–0.2)
BASOPHILS RELATIVE PERCENT: 0.9 %
BUN BLDV-MCNC: 32 MG/DL (ref 7–20)
CALCIUM SERPL-MCNC: 9.4 MG/DL (ref 8.3–10.6)
CHLORIDE BLD-SCNC: 105 MMOL/L (ref 99–110)
CO2: 24 MMOL/L (ref 21–32)
CREAT SERPL-MCNC: 2 MG/DL (ref 0.8–1.3)
CREATININE URINE: 22.2 MG/DL (ref 39–259)
EOSINOPHILS ABSOLUTE: 0.5 K/UL (ref 0–0.6)
EOSINOPHILS RELATIVE PERCENT: 7.4 %
GFR SERPL CREATININE-BSD FRML MDRD: 32 ML/MIN/{1.73_M2}
GLUCOSE BLD-MCNC: 105 MG/DL (ref 70–99)
HCT VFR BLD CALC: 42.3 % (ref 40.5–52.5)
HEMOGLOBIN: 13.8 G/DL (ref 13.5–17.5)
LYMPHOCYTES ABSOLUTE: 1.7 K/UL (ref 1–5.1)
LYMPHOCYTES RELATIVE PERCENT: 25 %
MCH RBC QN AUTO: 30.4 PG (ref 26–34)
MCHC RBC AUTO-ENTMCNC: 32.6 G/DL (ref 31–36)
MCV RBC AUTO: 93.2 FL (ref 80–100)
MONOCYTES ABSOLUTE: 0.6 K/UL (ref 0–1.3)
MONOCYTES RELATIVE PERCENT: 9.6 %
NEUTROPHILS ABSOLUTE: 3.8 K/UL (ref 1.7–7.7)
NEUTROPHILS RELATIVE PERCENT: 57.1 %
PDW BLD-RTO: 15 % (ref 12.4–15.4)
PHOSPHORUS: 3.4 MG/DL (ref 2.5–4.9)
PLATELET # BLD: 163 K/UL (ref 135–450)
PMV BLD AUTO: 9.4 FL (ref 5–10.5)
POTASSIUM SERPL-SCNC: 5 MMOL/L (ref 3.5–5.1)
PROTEIN PROTEIN: <4 MG/DL
PROTEIN/CREAT RATIO: ABNORMAL MG/DL
RBC # BLD: 4.54 M/UL (ref 4.2–5.9)
SODIUM BLD-SCNC: 138 MMOL/L (ref 136–145)
WBC # BLD: 6.6 K/UL (ref 4–11)

## 2022-11-16 PROCEDURE — 82570 ASSAY OF URINE CREATININE: CPT

## 2022-11-16 PROCEDURE — 85025 COMPLETE CBC W/AUTO DIFF WBC: CPT

## 2022-11-16 PROCEDURE — 80069 RENAL FUNCTION PANEL: CPT

## 2022-11-16 PROCEDURE — 36415 COLL VENOUS BLD VENIPUNCTURE: CPT

## 2022-11-16 PROCEDURE — 84156 ASSAY OF PROTEIN URINE: CPT

## 2022-12-06 ENCOUNTER — HOSPITAL ENCOUNTER (OUTPATIENT)
Age: 87
Setting detail: SPECIMEN
Discharge: HOME | End: 2022-12-06
Payer: MEDICARE

## 2022-12-06 LAB
ALBUMIN SERPL-MCNC: 3.8 G/DL (ref 3.4–5)
ANION GAP SERPL CALCULATED.3IONS-SCNC: 9 MMOL/L (ref 3–16)
BASOPHILS ABSOLUTE: 0.1 K/UL (ref 0–0.2)
BASOPHILS RELATIVE PERCENT: 1 %
BUN BLDV-MCNC: 44 MG/DL (ref 7–20)
CALCIUM SERPL-MCNC: 8.8 MG/DL (ref 8.3–10.6)
CHLORIDE BLD-SCNC: 106 MMOL/L (ref 99–110)
CO2: 23 MMOL/L (ref 21–32)
CREAT SERPL-MCNC: 2.1 MG/DL (ref 0.8–1.3)
EOSINOPHILS ABSOLUTE: 0.6 K/UL (ref 0–0.6)
EOSINOPHILS RELATIVE PERCENT: 8.3 %
GFR SERPL CREATININE-BSD FRML MDRD: 30 ML/MIN/{1.73_M2}
GLUCOSE BLD-MCNC: 97 MG/DL (ref 70–99)
HCT VFR BLD CALC: 41.5 % (ref 40.5–52.5)
HEMOGLOBIN: 13.5 G/DL (ref 13.5–17.5)
LYMPHOCYTES ABSOLUTE: 1.6 K/UL (ref 1–5.1)
LYMPHOCYTES RELATIVE PERCENT: 21.2 %
MCH RBC QN AUTO: 30.2 PG (ref 26–34)
MCHC RBC AUTO-ENTMCNC: 32.6 G/DL (ref 31–36)
MCV RBC AUTO: 92.5 FL (ref 80–100)
MONOCYTES ABSOLUTE: 0.8 K/UL (ref 0–1.3)
MONOCYTES RELATIVE PERCENT: 11 %
NEUTROPHILS ABSOLUTE: 4.5 K/UL (ref 1.7–7.7)
NEUTROPHILS RELATIVE PERCENT: 58.5 %
PDW BLD-RTO: 15.1 % (ref 12.4–15.4)
PHOSPHORUS: 3.4 MG/DL (ref 2.5–4.9)
PLATELET # BLD: 188 K/UL (ref 135–450)
PMV BLD AUTO: 8.7 FL (ref 5–10.5)
POTASSIUM SERPL-SCNC: 4.3 MMOL/L (ref 3.5–5.1)
RBC # BLD: 4.48 M/UL (ref 4.2–5.9)
SODIUM BLD-SCNC: 138 MMOL/L (ref 136–145)
WBC # BLD: 7.7 K/UL (ref 4–11)

## 2022-12-06 PROCEDURE — 36415 COLL VENOUS BLD VENIPUNCTURE: CPT

## 2022-12-06 PROCEDURE — 85025 COMPLETE CBC W/AUTO DIFF WBC: CPT

## 2022-12-06 PROCEDURE — 80069 RENAL FUNCTION PANEL: CPT

## 2022-12-19 ENCOUNTER — HOSPITAL ENCOUNTER (OUTPATIENT)
Age: 87
Setting detail: SPECIMEN
Discharge: HOME | End: 2022-12-19
Payer: MEDICARE

## 2022-12-19 LAB
ALBUMIN SERPL-MCNC: 3.7 G/DL (ref 3.4–5)
ANION GAP SERPL CALCULATED.3IONS-SCNC: 10 MMOL/L (ref 3–16)
BASOPHILS ABSOLUTE: 0.1 K/UL (ref 0–0.2)
BASOPHILS RELATIVE PERCENT: 0.9 %
BUN BLDV-MCNC: 42 MG/DL (ref 7–20)
CALCIUM SERPL-MCNC: 9.4 MG/DL (ref 8.3–10.6)
CHLORIDE BLD-SCNC: 106 MMOL/L (ref 99–110)
CO2: 25 MMOL/L (ref 21–32)
CREAT SERPL-MCNC: 2.1 MG/DL (ref 0.8–1.3)
EOSINOPHILS ABSOLUTE: 0.7 K/UL (ref 0–0.6)
EOSINOPHILS RELATIVE PERCENT: 9.6 %
GFR SERPL CREATININE-BSD FRML MDRD: 30 ML/MIN/{1.73_M2}
GLUCOSE BLD-MCNC: 153 MG/DL (ref 70–99)
HCT VFR BLD CALC: 40.5 % (ref 40.5–52.5)
HEMOGLOBIN: 13.2 G/DL (ref 13.5–17.5)
LYMPHOCYTES ABSOLUTE: 1.5 K/UL (ref 1–5.1)
LYMPHOCYTES RELATIVE PERCENT: 20.2 %
MCH RBC QN AUTO: 30.3 PG (ref 26–34)
MCHC RBC AUTO-ENTMCNC: 32.6 G/DL (ref 31–36)
MCV RBC AUTO: 93.1 FL (ref 80–100)
MONOCYTES ABSOLUTE: 0.8 K/UL (ref 0–1.3)
MONOCYTES RELATIVE PERCENT: 10.5 %
NEUTROPHILS ABSOLUTE: 4.5 K/UL (ref 1.7–7.7)
NEUTROPHILS RELATIVE PERCENT: 58.8 %
PDW BLD-RTO: 15.1 % (ref 12.4–15.4)
PHOSPHORUS: 3.6 MG/DL (ref 2.5–4.9)
PLATELET # BLD: 186 K/UL (ref 135–450)
PMV BLD AUTO: 9.3 FL (ref 5–10.5)
POTASSIUM SERPL-SCNC: 4.2 MMOL/L (ref 3.5–5.1)
RBC # BLD: 4.36 M/UL (ref 4.2–5.9)
SODIUM BLD-SCNC: 141 MMOL/L (ref 136–145)
WBC # BLD: 7.6 K/UL (ref 4–11)

## 2022-12-19 PROCEDURE — 36415 COLL VENOUS BLD VENIPUNCTURE: CPT

## 2022-12-19 PROCEDURE — 80069 RENAL FUNCTION PANEL: CPT

## 2022-12-19 PROCEDURE — 85025 COMPLETE CBC W/AUTO DIFF WBC: CPT
